# Patient Record
Sex: MALE | Race: WHITE
[De-identification: names, ages, dates, MRNs, and addresses within clinical notes are randomized per-mention and may not be internally consistent; named-entity substitution may affect disease eponyms.]

---

## 2018-11-19 ENCOUNTER — HOSPITAL ENCOUNTER (EMERGENCY)
Dept: HOSPITAL 53 - M ED | Age: 53
Discharge: HOME | End: 2018-11-19
Payer: COMMERCIAL
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
## 2018-11-19 DIAGNOSIS — S00.03XA: Primary | ICD-10-CM

## 2018-11-19 PROCEDURE — 70450 CT HEAD/BRAIN W/O DYE: CPT

## 2019-11-02 ENCOUNTER — HOSPITAL ENCOUNTER (OUTPATIENT)
Dept: HOSPITAL 53 - M SLEEP | Age: 54
End: 2019-11-02
Attending: NURSE PRACTITIONER
Payer: COMMERCIAL

## 2019-11-02 DIAGNOSIS — G47.33: Primary | ICD-10-CM

## 2019-11-05 NOTE — SLEEPCENT
DATE OF PROCEDURE:  11/02/2019

 

ORDERED BY:  INGRID Dunne

 

Nocturnal polysomnography was performed for the titration of pressure therapy in

this patient with a history of obstructive sleep apnea syndrome.

 

For testing a ResMed F20 AirTouch full face mask of medium size was used, 4 cm of

water pressure were applied to the circuit and the lights were extinguished.

 

7 hours and 58 minutes of data were reviewed.  There were 450 minutes of sleep

identified.  Sleep latency was short at 2 minutes.  Rapid eye movement (REM)

latency was short at 57 minutes.  Sleep architecture was good with five REM

cycles.  Overall sleep efficiency 95.8%.  The patient's electrocardiogram showed

a sinus rhythm with an average heart rate of 64 beats per minute.  EEG showed

normal waveforms for awake and sleep.  Persistence of respiratory events prompted

an increase in continuous positive airway pressure (CPAP) despite optimal mask

fit and minimal air leak.  The patient was changed to a bilevel device.  Pressure

was advanced to a maximal pressure of 22 over 18.  Some mild hypopneic events

were seen, but no significant oxygen desaturations were documented at this level.

Some activity was noted throughout the study in the limb EMG leads but limb

movement arousal index was only 4.3.

 

IMPRESSION:

Obstructive sleep apnea syndrome (G47.33).

 

RECOMMENDATIONS:

Nightly use of pressure therapy using a bilevel device, inspiratory 22 over

expiratory 18.

## 2019-12-09 ENCOUNTER — HOSPITAL ENCOUNTER (OUTPATIENT)
Dept: HOSPITAL 53 - M LAB REF | Age: 54
End: 2019-12-09
Attending: PODIATRIST
Payer: COMMERCIAL

## 2019-12-09 DIAGNOSIS — L03.031: Primary | ICD-10-CM

## 2019-12-30 ENCOUNTER — HOSPITAL ENCOUNTER (OUTPATIENT)
Dept: HOSPITAL 53 - M RAD | Age: 54
End: 2019-12-30
Attending: PODIATRIST
Payer: COMMERCIAL

## 2019-12-30 DIAGNOSIS — M79.604: Primary | ICD-10-CM

## 2019-12-30 NOTE — REP
Clinical: Right lower extremity pain and swelling .

 

Technique:  Gray scale and color Doppler evaluation using linear high frequency

transducer.

 

Findings:

Ultrasound examination of the right lower extremity deep venous structures from

the common femoral vein to the popliteal vein demonstrates normal compressibility

flow and wave patterns in response to respiration and augmentation.  There is no

evidence for deep venous thrombosis.

 

Impression:

No evidence for deep venous thrombosis.

 

 

Electronically Signed by

Ambrosio Allen MD 12/30/2019 10:02 A

## 2020-02-29 ENCOUNTER — HOSPITAL ENCOUNTER (OUTPATIENT)
Dept: HOSPITAL 53 - M RAD | Age: 55
End: 2020-02-29
Attending: PODIATRIST
Payer: COMMERCIAL

## 2020-02-29 DIAGNOSIS — M66.371: Primary | ICD-10-CM

## 2020-03-01 NOTE — REP
MRI RIGHT ANKLE:

 

TECHNIQUE:  Sagittal proton density, STIR, axial proton density fat sat, T1,

coronal proton density, STIR.

 

The Achilles tendon is intact. Anterior tibial, peroneal, and flexor hallucis

longus tendons are intact. There is mild fluid surrounding the flexor digitorum

longus tendon suggesting mild tenosynovitis. There is moderate fluid surrounding

the posterior tibial tendon suggesting tenosynovitis. In addition, the distal

posterior tibial tendon demonstrates increased signal on T2-weighted images at

its insertion suggesting a partial tear. Anterior and posterior talofibular,

calcaneofibular, tibiofibular, and deltoid ligaments are intact. Plantar fascia

is unremarkable, plantar tendon is intact. Ill-defined soft tissue edema is seen

posteromedially. Mild fluid is seen along the posterior tibiotalar joint.

Nonspecific marrow edema is seen in the medial aspect of the distal tibia as well

as throughout the talus. There is also marrow edema in the medial and anterior

calcaneus. There is scattered subcortical marrow edema in the navicular and

cuneiform bones. No chondral defect is seen along the talar dome.

 

IMPRESSION:

 

There appears to be a partial tear of the distal posterior tibial tendon at its

insertion onto the navicular. There appears to be associated tenosynovitis. There

is probable tenosynovitis of flexor digitorum longus tendon.

 

Nonspecific marrow edema is seen in the medial aspect of the distal tibia,

throughout the talus, and the medial and anterior calcaneus, and also in a

subcortical location in the navicular and cuneiform bones. Diffuse soft tissue

edema is noted posteromedially.

 

 

Electronically Signed by

Savage Vicente MD 03/01/2020 06:19 P

## 2020-06-02 ENCOUNTER — HOSPITAL ENCOUNTER (OUTPATIENT)
Dept: HOSPITAL 53 - M LABSMTC | Age: 55
End: 2020-06-02
Attending: ANESTHESIOLOGY
Payer: COMMERCIAL

## 2020-06-02 DIAGNOSIS — Z11.59: ICD-10-CM

## 2020-06-02 DIAGNOSIS — Z01.818: Primary | ICD-10-CM

## 2020-06-05 ENCOUNTER — HOSPITAL ENCOUNTER (OUTPATIENT)
Dept: HOSPITAL 53 - M SDC | Age: 55
Discharge: HOME | End: 2020-06-05
Attending: PODIATRIST
Payer: COMMERCIAL

## 2020-06-05 VITALS — WEIGHT: 270 LBS | HEIGHT: 71 IN | BODY MASS INDEX: 37.8 KG/M2

## 2020-06-05 VITALS — DIASTOLIC BLOOD PRESSURE: 73 MMHG | SYSTOLIC BLOOD PRESSURE: 122 MMHG

## 2020-06-05 DIAGNOSIS — Z91.018: ICD-10-CM

## 2020-06-05 DIAGNOSIS — Z79.899: ICD-10-CM

## 2020-06-05 DIAGNOSIS — I10: ICD-10-CM

## 2020-06-05 DIAGNOSIS — Z79.84: ICD-10-CM

## 2020-06-05 DIAGNOSIS — E11.9: ICD-10-CM

## 2020-06-05 DIAGNOSIS — Z91.010: ICD-10-CM

## 2020-06-05 DIAGNOSIS — M20.41: ICD-10-CM

## 2020-06-05 DIAGNOSIS — J45.909: ICD-10-CM

## 2020-06-05 DIAGNOSIS — G47.30: ICD-10-CM

## 2020-06-05 DIAGNOSIS — M10.9: ICD-10-CM

## 2020-06-05 DIAGNOSIS — M20.11: Primary | ICD-10-CM

## 2020-06-05 PROCEDURE — 76000 FLUOROSCOPY <1 HR PHYS/QHP: CPT

## 2020-06-05 PROCEDURE — 28285 REPAIR OF HAMMERTOE: CPT

## 2020-06-05 PROCEDURE — 28270 RELEASE OF FOOT CONTRACTURE: CPT

## 2020-06-05 PROCEDURE — 28296 COR HLX VLGS DSTL MTAR OSTEO: CPT

## 2020-06-05 PROCEDURE — 97116 GAIT TRAINING THERAPY: CPT

## 2020-06-05 PROCEDURE — 73630 X-RAY EXAM OF FOOT: CPT

## 2020-06-05 PROCEDURE — 88300 SURGICAL PATH GROSS: CPT

## 2020-06-05 NOTE — REP
REASON:  Status post bunionectomy.

 

Three images were obtained.

 

Fixation pins are seen affixing and fusing digits 2 through 4, inclusive. There

is a single screw affixing a derotational osteotomy  of the 1st

metatarsal. The examination is limited by overlying casting material.

 

 

Electronically Signed by

Isma Marie DO 06/05/2020 05:13 P

## 2020-06-10 NOTE — RO
DATE OF PROCEDURE:  06/05/2020

 

PREPROCEDURE DIAGNOSES:  Hallux valgus metatarsus primus varus deformity, right

foot. Hammertoe deformity digits 2, 3, 4, and 5 right foot.

 

POSTPROCEDURE DIAGNOSES:  Hallux valgus metatarsus primus varus deformity, right

foot. Hammertoe deformity digits 2, 3, 4, and 5 right foot.

 

 

PROCEDURES PERFORMED:

1.  Arnel bunionectomy with internal screw fixation.

2.  Proximal interphalangeal joint arthroplasty, 2nd toe right foot.

3.  Proximal interphalangeal joint arthroplasty, 3rd toe right foot.

4.  Proximal interphalangeal joint arthroplasty, 4th toe right foot.

5.  Proximal interphalangeal joint arthroplasty 5th toe right foot.

6.  Extensor capsulotomy 2nd metatarsophalangeal joint right foot.

7.  Extensor capsulotomy 3rd metatarsophalangeal joint right foot.

8.  Extensor capsulotomy 4th metatarsophalangeal joint  right foot.

 

SURGEON:  Dr. Landry Celestin.

 

ASSISTANT:  None.

 

ANESTHESIA:  Local with monitored anesthesia care (MAC).

 

HEMOSTASIS:  Ankle pneumatic tourniquet at 225 mmHg for 82 minutes.

 

HARDWARE UTILIZED:  Arthrex headless 3.0 x 30 mm compression screw and 0.045 wire

times three.

 

DESCRIPTION OF PROCEDURE:  On 06/05/2020, this 54-year-old male was taken from

his hospital room to the operating room and placed on the operating room table in

supine position. Following the induction of IV sedation and local and regional

anesthesia, the right lower extremity was prepped and draped in the usual aseptic

manner. Attention was directed to the patient's right foot where there was noted

to be a moderate hallux valgus deformity.  At this time, the following procedure

was performed.

 

ARNEL BUNIONECTOMY, INTERNAL SCREW FIXATION 3.0 MM X 30 MM X 1 RIGHT FOOT:

Attention was directed to the patient's right foot. There was noted to be a

hallux valgus deformity. At this time, a 7 cm incision was placed over the first

metatarsophalangeal joint. The incision was deepened to the subcutaneous tissues,

and all coursing venous tributaries were identified, underscored, clamped, cut

ligated, and electrocoagulated as necessary. A linear capsulotomy was performed

in the same plane as the original skin incision. The capsule and periosteal

structures were then dissected free in one continuous layer, dorsally, medially

and laterally, thus creating a capsular periosteal type envelope. This delivered

into view the hypertrophied medial eminence of the first metatarsal which was

osteotomized from distal to plantar through and through.

 

Attention was directed into the medial surface of the 1st intermetatarsal and the

following Arnel osteotomy was performed.  This was with a long plantar wing and

a short dorsal wing. Attention was directed to the distal metaphysis where this

V-shaped osteotomy was created from medial to lateral through and through and

this was transposed approximately 30% width of the shaft of the 1st metatarsal

and then fixated with a 3.0 x 30 mm headless compression screw.  The osteotomy

was noted to be stable in all three cardinal planes. The redundant cortical spike

was then osteotomized from the dorsal to plantar, through and through.  The wound

was flushed with copious amounts of dilute bacitracin, neomycin and polymyxin B

solution.

 

Attention was directed toward closure where the capsular structures were coapted

and maintained using #2-0 Monocryl in a simple interrupted type fashion.  The

subcutaneous tissues were coapted and maintained using #4-0 Monocryl in a simple

interrupted type fashion. Skin incisions were coapted and maintained using #4-0

Prolene in a simple interrupted fashion. Attention was then directed to the

patient's 2nd toe where the following procedure was performed.

 

PROXIMAL INTERPHALAGEAL JOINT ARTHROPLASTY WITH EXTERNAL WIRE FIXATION 0.045

TIMES ONE, 2ND TOE RIGHT FOOT:

Attention was directed to the patient's 2nd toe where there was noted to be a

hammertoe deformity.  At this time, a 3 cm incision was placed over the proximal

interphalangeal joint.  The incision was deepened to the subcutaneous tissues and

all coursing tributaries were identified, underscored, clamp, cut, ligated and

electrocoagulated as necessary.  A linear tenotomy and capsulotomy was then

performed at the proximal interphalangeal joint.  The medial and lateral

collateral ligaments are sharply dissected free from the head of the proximal

phalanx.  Utilizing a power saw and osteotomy was performed in the anatomical

length of the proximal from dorsal to planar, medial to lateral through and

through.  Attention was directed to the patient's middle phalanx where the

cartilage was removed from dorsal to planar through and through.  This was

extirpated from the wound.  The wound was flushed with copious amount of dilute

bacitracin, neomycin and polymyxin B solution.  There was still noted to be a

contracture at the metatarsophalangeal joint.  Therefore, the following procedure

was performed.

 

EXTENSOR CAPSULOTOMY 2ND METATARSOPHALANGEAL JOINT RIGHT FOOT:

Attention was directed to the patient's metatarsophalangeal joint where a stab

incision was placed over the dorsal aspect of the 2nd metatarsophalangeal joint.

The extensor tendon then was visualized and utilizing the previous excision, the

extensor expansion and sherwood was dissected and the tendon to the 2nd toe was then

pulled through the stab incision.  This allowed access to the metatarsophalangeal

joint capsule without risk to damaging the extensor tendon.  The dorsal

capsulotomy was then performed.  The wound was then flushed with copious amounts

of dilute bacitracin, neomycin and polymyxin B solution. Utilizing a hemostat,

the tendon was then brought back into its original position.  Utilizing a 0.045

wire, a wire was driven percutaneously through the middle and distal phalanx and

then retrograded into the proximal phalanx utilizing C-arm imagery and proximal

to the joint metatarsophalangeal joint.  The wire was bent in a  protective gall

and placed in the distal end of the wire.

 

The procedure performed on the 4th toe is now performed on the 5th toe with the

following variations: No K-wire was utilized to stabilize the 5th toe and the

cartilage was not removed from the middle phalanx. Following the completion of

the surgical procedure 4 mg of dexamethasone sodium phosphorus was instilled in

the proximal surgical site. Attention was direct to bandaging with sterile

compressive bandage applied consisting of Adaptic, 4x4's, splints and Koban.

Ankle pneumatic tourniquet was rapidly deflated and instantaneous capillary

filling was noted to digits 1-5 to the patient's right foot. The patient after

having tolerated the procedure well was taken to the recovery room for further

monitoring by the anesthesia department. Postoperative instructions were given

upon discharge.

 

This was additionally reinforced with Steri-Strips.  Following the completion of

the surgical procedure, approximately 4 mg of dexamethasone sodium phosphorous

was instilled proximal to the surgical site.  Attention was directed toward

closure where the extensor tendon was coapted and maintained utilizing #4-0

SUPERAMID in a four-stranded core repair in a Self fashion.  The skin was

coapted and maintained utilizing #4-0 Prolene in a simple interrupted type

fashion. The skin over the 2nd metatarsophalangeal joint capsulotomy was repaired

with #4-0 Prolene in a simple interrupted type fashion.  Attention was then

directed to the 3rd toe where the following procedure was performed.

 

PROXIMAL INTERPHALAGEAL JOINT ARTHROPLASTY WITH EXTERNAL WIRE FIXATION 0.045

TIMES ONE, 3RD TOE RIGHT FOOT:

Attention was directed to the patient's 3rd toe right foot where the procedure

was performed on the 2nd toe was now performed on the 3rd toe without variation.

or deletion except for that of anatomical location.  Attention was then directed

to the 4th toe where the following procedure was performed.

 

PROXIMAL INTERPHALAGEAL JOINT ARTHROPLASTY WITH EXTERNAL WIRE FIXATION 0.045

TIMES ONE, 4TH TOE RIGHT FOOT:

Attention was directed to the patient's 4th toe right foot where the procedure

was performed on the 2nd toe was now performed on the 4th toe without variation

or deletion.  The only exception being that of anatomical location.  Attention

was then directed to the 5th toe where the following procedure was performed.

 

PROXIMAL INTERPHALAGEAL JOINT ARTHROPLASTY 5TH TOE RIGHT FOOT:

Attention was directed to the patient's 5th toe right foot where the procedure

was performed on the 2nd toe was now performed on the 5th toe with the following

variation.  No external wire was utilized.  Attention was then directed to the

3rd metatarsophalangeal joint, where the following procedure was performed.

 

EXTENSOR CAPSULOTOMY 3RD METATARSOPHALANGEAL JOINT, RIGHT FOOT:

Attention was directed to the patient's 3rd metatarsophalangeal joint where the

procedure performed on the second metatarsophalangeal joint was now performed on

the 3rd metatarsophalangeal joint without variation or deletion. The only

exception being anatomical location.  Attention was then directed to the 3rd

metatarsophalangeal joint, where the following procedure was performed.

 

EXTENSOR CAPSULOTOMY 4TH METATARSOPHALANGEAL JOINT, RIGHT FOOT:

Attention was directed to the patient's 4th metatarsophalangeal joint where the

procedure performed on the second metatarsophalangeal joint was now performed on

the 4th metatarsophalangeal joint without variation or deletion. The only

exception being anatomical location.

 

Attention was then directed towards bandaging where a sterile compressive bandage

applied consisting of Adaptic, 4x4's, 4 x 4 splints, Mimi and Kerlix. Ankle

pneumatic tourniquet was rapidly deflated and instantaneous capillary filling was

noted to digits 1-5 to the patient's right foot. A fiberglass cast was then

applied to the patient's right foot.  The patient after having tolerated the

procedure well was taken to the recovery room, vital signs stable, afebrile for

further monitoring by the anesthesia department.  All surgical specimens  during

the operative procedure were sent to pathology for gross or microscopic

examination.  Postoperative instructions were given upon discharge.

## 2021-04-02 ENCOUNTER — HOSPITAL ENCOUNTER (OUTPATIENT)
Dept: HOSPITAL 53 - M RAD | Age: 56
End: 2021-04-02
Attending: PHYSICIAN ASSISTANT
Payer: COMMERCIAL

## 2021-04-02 DIAGNOSIS — M50.322: ICD-10-CM

## 2021-04-02 DIAGNOSIS — M50.321: Primary | ICD-10-CM

## 2021-04-02 NOTE — REPVR
PROCEDURE INFORMATION: 

Exam: MR Cervical Spine Without Contrast 

Exam date and time: 4/2/2021 10:20 AM 

Age: 55 years old 

Clinical indication: Neck pain; Additional info: Cervical disc degeneration 



TECHNIQUE: 

Imaging protocol: Multiplanar magnetic resonance images of the cervical spine 

without contrast. 



COMPARISON: 

CT Spine,cervical w/o contrast 11/19/2018 1:51 PM 



FINDINGS: 

Vertebrae: There is straightening of the cervical spine which could be 

secondary to positioning or muscle spasm. The cervical vertebral bodies are 

normal height and alignment.No acute fracture or dislocation is seen.The 

atlantoaxial articulation is normal. 

Spinal cord: The cervical spinal cord is normal in thickness and signal 

intensity.There is no cord compression or intramedullary signal abnormality. 

Spinal epidural space: There is no evidence for epidural mass or hemorrhage. 

C2-C3: No significant disc disease. No significant spinal stenosis. 

C3-C4: There is no significant degenerative disc herniation.The spinal canal 

and neural foramina are patent and without significant stenosis. 

C4-C5: Markedly reduced in height and T2 signal indicating degeneration.  Mild 

degenerative endplate changes.  Small diffuse posterior herniation.There is 

bilateral uncovertebral hypertrophic changes.The facet joints demonstrate mild 

degenerative hypertrophy and sclerosis.There is mild spinal canal narrowing, 

with an AP canal dimension of 10 mm. There is severe bilateral foraminal 

stenosis. There is compression on the bilateral exiting nerve root.   

C5-C6: Markedly reduced in height and T2 signal indicating degeneration.  Mild 

degenerative endplate changes.  Small diffuse posterior herniation.There is 

bilateral uncovertebral hypertrophic changes.The facet joints demonstrate mild 

degenerative hypertrophy and sclerosis.There is mild spinal canal narrowing, 

with an AP canal dimension of 10 mm. There is severe bilateral foraminal 

stenosis. There is compression on the bilateral exiting nerve root.    

C6-C7: There is a mild diffuse posterior bulge causing mild effacement of the 

thecal sac.The facet joints demonstrate mild degenerative hypertrophy and 

sclerosis.There is no evidence of spinal canal narrowing.There is mild 

bilateral foraminal stenosis. 

C7-T1: There is no significant degenerative disc herniation.The spinal canal 

and neural foramina are patent and without significant stenosis. 

Soft tissues: The prevertebral soft tissues appear normal. 



Brain: The visualized brain parenchyma is unremarkable. 

Vertebral arteries: Expected flow voids in the vertebral arteries. 



IMPRESSION: 

1. MRI of the cervical spine reveals multilevel degenerative spondylitic 

changes and degenerative disc disease, most significant at C4-C5 and C5-C6 

levels as described above. 

2. The cervical spinal cord is normal in thickness and signal intensity.There 

is no cord compression or intramedullary signal abnormality. 



Electronically signed by: William Ward On 04/02/2021  11:02:05 AM

## 2021-10-11 ENCOUNTER — HOSPITAL ENCOUNTER (OUTPATIENT)
Dept: HOSPITAL 53 - M LAB REF | Age: 56
End: 2021-10-11
Attending: PODIATRIST
Payer: COMMERCIAL

## 2021-10-11 DIAGNOSIS — L97.512: ICD-10-CM

## 2021-10-11 DIAGNOSIS — M79.671: Primary | ICD-10-CM

## 2022-11-04 ENCOUNTER — HOSPITAL ENCOUNTER (OUTPATIENT)
Dept: HOSPITAL 53 - M WUC | Age: 57
End: 2022-11-04
Attending: PHYSICIAN ASSISTANT
Payer: COMMERCIAL

## 2022-11-04 DIAGNOSIS — F52.21: ICD-10-CM

## 2022-11-04 DIAGNOSIS — I10: ICD-10-CM

## 2022-11-04 DIAGNOSIS — E78.5: Primary | ICD-10-CM

## 2022-11-04 DIAGNOSIS — E11.9: ICD-10-CM

## 2022-11-04 LAB
ALBUMIN SERPL BCG-MCNC: 4.4 GM/DL (ref 3.2–5.2)
BASOPHILS # BLD AUTO: 0.1 10^3/UL (ref 0–0.2)
BASOPHILS NFR BLD AUTO: 0.6 % (ref 0–1)
BUN SERPL-MCNC: 14 MG/DL (ref 7–18)
CALCIUM SERPL-MCNC: 9.7 MG/DL (ref 8.5–10.1)
CHLORIDE SERPL-SCNC: 103 MEQ/L (ref 98–107)
CHOLEST SERPL-MCNC: 118 MG/DL (ref ?–200)
CHOLEST/HDLC SERPL: 3.81 {RATIO} (ref ?–5)
CO2 SERPL-SCNC: 27 MEQ/L (ref 21–32)
CREAT SERPL-MCNC: 0.67 MG/DL (ref 0.7–1.3)
EOSINOPHIL # BLD AUTO: 0.1 10^3/UL (ref 0–0.5)
EOSINOPHIL NFR BLD AUTO: 1.8 % (ref 0–3)
EST. AVERAGE GLUCOSE BLD GHB EST-MCNC: 180 MG/DL (ref 60–110)
GFR SERPL CREATININE-BSD FRML MDRD: > 60 ML/MIN/{1.73_M2} (ref 56–?)
GLUCOSE SERPL-MCNC: 139 MG/DL (ref 70–100)
HCT VFR BLD AUTO: 49.7 % (ref 42–52)
HDLC SERPL-MCNC: 31 MG/DL (ref 40–?)
HGB BLD-MCNC: 15.8 G/DL (ref 13.5–17.5)
LDLC SERPL CALC-MCNC: 39 MG/DL (ref ?–100)
LYMPHOCYTES # BLD AUTO: 2.4 10^3/UL (ref 1.5–5)
LYMPHOCYTES NFR BLD AUTO: 30.7 % (ref 24–44)
MCH RBC QN AUTO: 29.7 PG (ref 27–33)
MCHC RBC AUTO-ENTMCNC: 31.8 G/DL (ref 32–36.5)
MCV RBC AUTO: 93.4 FL (ref 80–96)
MONOCYTES # BLD AUTO: 0.6 10^3/UL (ref 0–0.8)
MONOCYTES NFR BLD AUTO: 8 % (ref 2–8)
NEUTROPHILS # BLD AUTO: 4.6 10^3/UL (ref 1.5–8.5)
NEUTROPHILS NFR BLD AUTO: 58.3 % (ref 36–66)
NONHDLC SERPL-MCNC: 87 MG/DL
PHOSPHATE SERPL-MCNC: 3.5 MG/DL (ref 2.5–4.9)
PLATELET # BLD AUTO: 163 10^3/UL (ref 150–450)
POTASSIUM SERPL-SCNC: 4.5 MEQ/L (ref 3.5–5.1)
RBC # BLD AUTO: 5.32 10^6/UL (ref 4.3–6.1)
SODIUM SERPL-SCNC: 139 MEQ/L (ref 136–145)
TRIGL SERPL-MCNC: 239 MG/DL (ref ?–150)
WBC # BLD AUTO: 8 10^3/UL (ref 4–10)

## 2022-11-22 ENCOUNTER — HOSPITAL ENCOUNTER (OUTPATIENT)
Dept: HOSPITAL 53 - M LAB REF | Age: 57
End: 2022-11-22
Attending: PODIATRIST
Payer: COMMERCIAL

## 2022-11-22 DIAGNOSIS — M79.671: Primary | ICD-10-CM

## 2023-01-27 ENCOUNTER — HOSPITAL ENCOUNTER (OUTPATIENT)
Dept: HOSPITAL 53 - M IRPRO | Age: 58
End: 2023-01-27
Attending: PHYSICIAN ASSISTANT
Payer: COMMERCIAL

## 2023-01-27 DIAGNOSIS — M75.21: Primary | ICD-10-CM

## 2023-01-27 PROCEDURE — 20206 BIOPSY MUSCLE PERQ NEEDLE: CPT

## 2023-01-27 PROCEDURE — 76942 ECHO GUIDE FOR BIOPSY: CPT

## 2023-02-06 ENCOUNTER — HOSPITAL ENCOUNTER (OUTPATIENT)
Dept: HOSPITAL 53 - M WUC | Age: 58
End: 2023-02-06
Attending: PHYSICIAN ASSISTANT
Payer: COMMERCIAL

## 2023-02-06 DIAGNOSIS — E11.9: ICD-10-CM

## 2023-02-06 DIAGNOSIS — E75.5: ICD-10-CM

## 2023-02-06 DIAGNOSIS — I10: Primary | ICD-10-CM

## 2023-02-06 DIAGNOSIS — R53.83: ICD-10-CM

## 2023-02-06 LAB
ALBUMIN SERPL BCG-MCNC: 4.2 G/DL (ref 3.2–5.2)
ALP SERPL-CCNC: 92 U/L (ref 46–116)
ALT SERPL W P-5'-P-CCNC: 66 U/L (ref 7–40)
AST SERPL-CCNC: 32 U/L (ref ?–34)
BASOPHILS # BLD AUTO: 0.1 10^3/UL (ref 0–0.2)
BASOPHILS NFR BLD AUTO: 0.6 % (ref 0–1)
BILIRUB SERPL-MCNC: 0.6 MG/DL (ref 0.3–1.2)
BUN SERPL-MCNC: 24 MG/DL (ref 9–23)
CALCIUM SERPL-MCNC: 9.5 MG/DL (ref 8.5–10.1)
CHLORIDE SERPL-SCNC: 103 MMOL/L (ref 98–107)
CHOLEST SERPL-MCNC: 103 MG/DL (ref ?–200)
CHOLEST/HDLC SERPL: 3.9 {RATIO} (ref ?–5)
CO2 SERPL-SCNC: 29 MMOL/L (ref 20–31)
CREAT SERPL-MCNC: 0.61 MG/DL (ref 0.7–1.3)
EOSINOPHIL # BLD AUTO: 0.1 10^3/UL (ref 0–0.5)
EOSINOPHIL NFR BLD AUTO: 1.3 % (ref 0–3)
EST. AVERAGE GLUCOSE BLD GHB EST-MCNC: 143 MG/DL (ref 60–110)
GFR SERPL CREATININE-BSD FRML MDRD: > 60 ML/MIN/{1.73_M2} (ref 56–?)
GLUCOSE SERPL-MCNC: 117 MG/DL (ref 60–100)
HCT VFR BLD AUTO: 48.6 % (ref 42–52)
HDLC SERPL-MCNC: 26.4 MG/DL (ref 40–?)
HGB BLD-MCNC: 15.6 G/DL (ref 13.5–17.5)
LDLC SERPL CALC-MCNC: 38.8 MG/DL (ref ?–100)
LYMPHOCYTES # BLD AUTO: 2.6 10^3/UL (ref 1.5–5)
LYMPHOCYTES NFR BLD AUTO: 30.5 % (ref 24–44)
MCH RBC QN AUTO: 29.8 PG (ref 27–33)
MCHC RBC AUTO-ENTMCNC: 32.1 G/DL (ref 32–36.5)
MCV RBC AUTO: 92.7 FL (ref 80–96)
MONOCYTES # BLD AUTO: 0.7 10^3/UL (ref 0–0.8)
MONOCYTES NFR BLD AUTO: 7.8 % (ref 2–8)
NEUTROPHILS # BLD AUTO: 5.1 10^3/UL (ref 1.5–8.5)
NEUTROPHILS NFR BLD AUTO: 59.3 % (ref 36–66)
NONHDLC SERPL-MCNC: 77 MG/DL
PLATELET # BLD AUTO: 161 10^3/UL (ref 150–450)
POTASSIUM SERPL-SCNC: 4.6 MMOL/L (ref 3.5–5.1)
PROT SERPL-MCNC: 7.6 G/DL (ref 5.7–8.2)
RBC # BLD AUTO: 5.24 10^6/UL (ref 4.3–6.1)
SODIUM SERPL-SCNC: 139 MMOL/L (ref 136–145)
T4 FREE SERPL-MCNC: 1.03 NG/DL (ref 0.89–1.76)
TRIGL SERPL-MCNC: 189 MG/DL (ref ?–150)
TSH SERPL DL<=0.005 MIU/L-ACNC: 1.56 UIU/ML (ref 0.55–4.78)
WBC # BLD AUTO: 8.6 10^3/UL (ref 4–10)

## 2023-05-02 ENCOUNTER — HOSPITAL ENCOUNTER (OUTPATIENT)
Dept: HOSPITAL 53 - M LAB REF | Age: 58
End: 2023-05-02
Attending: PODIATRIST
Payer: COMMERCIAL

## 2023-05-02 DIAGNOSIS — L03.031: Primary | ICD-10-CM

## 2023-07-13 ENCOUNTER — HOSPITAL ENCOUNTER (OUTPATIENT)
Dept: HOSPITAL 53 - M LAB REF | Age: 58
End: 2023-07-13
Attending: PODIATRIST
Payer: COMMERCIAL

## 2023-07-13 DIAGNOSIS — L03.129: Primary | ICD-10-CM

## 2023-09-08 ENCOUNTER — HOSPITAL ENCOUNTER (OUTPATIENT)
Dept: HOSPITAL 53 - M WUC | Age: 58
End: 2023-09-08
Attending: NURSE PRACTITIONER
Payer: COMMERCIAL

## 2023-09-08 DIAGNOSIS — I10: Primary | ICD-10-CM

## 2023-09-08 DIAGNOSIS — E11.9: ICD-10-CM

## 2023-09-08 DIAGNOSIS — R53.83: ICD-10-CM

## 2023-09-08 DIAGNOSIS — E78.5: ICD-10-CM

## 2023-09-08 LAB
ALBUMIN SERPL BCG-MCNC: 4 G/DL (ref 3.2–5.2)
ALP SERPL-CCNC: 97 U/L (ref 46–116)
ALT SERPL W P-5'-P-CCNC: 79 U/L (ref 7–40)
AST SERPL-CCNC: 30 U/L (ref ?–34)
BASOPHILS # BLD AUTO: 0 10^3/UL (ref 0–0.2)
BASOPHILS NFR BLD AUTO: 0.5 % (ref 0–1)
BILIRUB SERPL-MCNC: 0.5 MG/DL (ref 0.3–1.2)
BUN SERPL-MCNC: 12 MG/DL (ref 9–23)
CALCIUM SERPL-MCNC: 9.2 MG/DL (ref 8.3–10.6)
CHLORIDE SERPL-SCNC: 103 MMOL/L (ref 98–107)
CHOLEST SERPL-MCNC: 77 MG/DL (ref ?–200)
CHOLEST/HDLC SERPL: 3.16 {RATIO} (ref ?–5)
CO2 SERPL-SCNC: 29 MMOL/L (ref 20–31)
CREAT SERPL-MCNC: 0.62 MG/DL (ref 0.7–1.3)
EOSINOPHIL # BLD AUTO: 0.2 10^3/UL (ref 0–0.5)
EOSINOPHIL NFR BLD AUTO: 2.4 % (ref 0–3)
EST. AVERAGE GLUCOSE BLD GHB EST-MCNC: 169 MG/DL (ref 60–110)
GFR SERPL CREATININE-BSD FRML MDRD: > 60 ML/MIN/{1.73_M2} (ref 49–?)
GLUCOSE SERPL-MCNC: 128 MG/DL (ref 74–106)
HCT VFR BLD AUTO: 44.8 % (ref 42–52)
HDLC SERPL-MCNC: 24.3 MG/DL (ref 40–?)
HGB BLD-MCNC: 14.7 G/DL (ref 13.5–17.5)
LDLC SERPL CALC-MCNC: 19.3 MG/DL (ref ?–100)
LYMPHOCYTES # BLD AUTO: 2.1 10^3/UL (ref 1.5–5)
LYMPHOCYTES NFR BLD AUTO: 32.2 % (ref 24–44)
MAGNESIUM SERPL-MCNC: 2.1 MG/DL (ref 1.8–2.4)
MCH RBC QN AUTO: 30 PG (ref 27–33)
MCHC RBC AUTO-ENTMCNC: 32.8 G/DL (ref 32–36.5)
MCV RBC AUTO: 91.4 FL (ref 80–96)
MONOCYTES # BLD AUTO: 0.6 10^3/UL (ref 0–0.8)
MONOCYTES NFR BLD AUTO: 9.1 % (ref 2–8)
NEUTROPHILS # BLD AUTO: 3.7 10^3/UL (ref 1.5–8.5)
NEUTROPHILS NFR BLD AUTO: 55.5 % (ref 36–66)
NONHDLC SERPL-MCNC: 52.7 MG/DL
PLATELET # BLD AUTO: 149 10^3/UL (ref 150–450)
POTASSIUM SERPL-SCNC: 4.4 MMOL/L (ref 3.5–5.1)
PROT SERPL-MCNC: 7.4 G/DL (ref 5.7–8.2)
RBC # BLD AUTO: 4.9 10^6/UL (ref 4.3–6.1)
SODIUM SERPL-SCNC: 140 MMOL/L (ref 136–145)
T4 FREE SERPL-MCNC: 0.9 NG/DL (ref 0.89–1.76)
TRIGL SERPL-MCNC: 167 MG/DL (ref ?–150)
TSH SERPL DL<=0.005 MIU/L-ACNC: 2.12 UIU/ML (ref 0.55–4.78)
WBC # BLD AUTO: 6.6 10^3/UL (ref 4–10)

## 2023-12-01 ENCOUNTER — HOSPITAL ENCOUNTER (OUTPATIENT)
Dept: HOSPITAL 53 - M PLALAB | Age: 58
End: 2023-12-01
Attending: NURSE PRACTITIONER
Payer: COMMERCIAL

## 2023-12-01 DIAGNOSIS — J44.9: ICD-10-CM

## 2023-12-01 DIAGNOSIS — E78.5: ICD-10-CM

## 2023-12-01 DIAGNOSIS — K21.9: ICD-10-CM

## 2023-12-01 DIAGNOSIS — E11.9: Primary | ICD-10-CM

## 2023-12-01 DIAGNOSIS — I10: ICD-10-CM

## 2023-12-01 LAB
ALBUMIN SERPL BCG-MCNC: 4.1 G/DL (ref 3.2–5.2)
ALP SERPL-CCNC: 102 U/L (ref 46–116)
ALT SERPL W P-5'-P-CCNC: 83 U/L (ref 7–40)
AST SERPL-CCNC: 37 U/L (ref ?–34)
BILIRUB SERPL-MCNC: 0.7 MG/DL (ref 0.3–1.2)
BUN SERPL-MCNC: 13 MG/DL (ref 9–23)
CALCIUM SERPL-MCNC: 9.1 MG/DL (ref 8.3–10.6)
CHLORIDE SERPL-SCNC: 102 MMOL/L (ref 98–107)
CHOLEST SERPL-MCNC: 90 MG/DL (ref ?–200)
CHOLEST/HDLC SERPL: 3.65 {RATIO} (ref ?–5)
CO2 SERPL-SCNC: 29 MMOL/L (ref 20–31)
CREAT SERPL-MCNC: 0.58 MG/DL (ref 0.7–1.3)
EST. AVERAGE GLUCOSE BLD GHB EST-MCNC: 180 MG/DL (ref 60–110)
GFR SERPL CREATININE-BSD FRML MDRD: > 60 ML/MIN/{1.73_M2} (ref 49–?)
GLUCOSE SERPL-MCNC: 143 MG/DL (ref 74–106)
HCT VFR BLD AUTO: 48.3 % (ref 42–52)
HDLC SERPL-MCNC: 24.6 MG/DL (ref 40–?)
HGB BLD-MCNC: 15.9 G/DL (ref 13.5–17.5)
LDLC SERPL CALC-MCNC: 20.6 MG/DL (ref ?–100)
MCH RBC QN AUTO: 30.2 PG (ref 27–33)
MCHC RBC AUTO-ENTMCNC: 32.9 G/DL (ref 32–36.5)
MCV RBC AUTO: 91.7 FL (ref 80–96)
NONHDLC SERPL-MCNC: 65.4 MG/DL
PLATELET # BLD AUTO: 165 10^3/UL (ref 150–450)
POTASSIUM SERPL-SCNC: 4.2 MMOL/L (ref 3.5–5.1)
PROT SERPL-MCNC: 7.3 G/DL (ref 5.7–8.2)
RBC # BLD AUTO: 5.27 10^6/UL (ref 4.3–6.1)
SODIUM SERPL-SCNC: 140 MMOL/L (ref 136–145)
TRIGL SERPL-MCNC: 224 MG/DL (ref ?–150)
WBC # BLD AUTO: 8.9 10^3/UL (ref 4–10)

## 2024-01-15 ENCOUNTER — HOSPITAL ENCOUNTER (OUTPATIENT)
Dept: HOSPITAL 53 - M LAB REF | Age: 59
End: 2024-01-15
Attending: PODIATRIST
Payer: COMMERCIAL

## 2024-01-15 DIAGNOSIS — B95.7: ICD-10-CM

## 2024-01-15 DIAGNOSIS — L03.031: Primary | ICD-10-CM

## 2024-02-05 ENCOUNTER — HOSPITAL ENCOUNTER (OUTPATIENT)
Dept: HOSPITAL 53 - M LAB REF | Age: 59
End: 2024-02-05
Attending: PODIATRIST
Payer: COMMERCIAL

## 2024-02-05 DIAGNOSIS — L03.031: Primary | ICD-10-CM

## 2024-02-29 ENCOUNTER — HOSPITAL ENCOUNTER (OUTPATIENT)
Dept: HOSPITAL 53 - M LAB REF | Age: 59
End: 2024-02-29
Attending: PODIATRIST
Payer: COMMERCIAL

## 2024-02-29 DIAGNOSIS — L03.031: Primary | ICD-10-CM

## 2024-03-18 ENCOUNTER — HOSPITAL ENCOUNTER (OUTPATIENT)
Dept: HOSPITAL 53 - M WUC | Age: 59
End: 2024-03-18
Attending: NURSE PRACTITIONER
Payer: COMMERCIAL

## 2024-03-18 DIAGNOSIS — E78.5: ICD-10-CM

## 2024-03-18 DIAGNOSIS — K21.9: ICD-10-CM

## 2024-03-18 DIAGNOSIS — I10: ICD-10-CM

## 2024-03-18 DIAGNOSIS — E11.9: Primary | ICD-10-CM

## 2024-03-18 DIAGNOSIS — J44.9: ICD-10-CM

## 2024-03-18 LAB
ALBUMIN SERPL BCG-MCNC: 4.3 G/DL (ref 3.2–5.2)
ALP SERPL-CCNC: 101 U/L (ref 46–116)
ALT SERPL W P-5'-P-CCNC: 72 U/L (ref 7–40)
AST SERPL-CCNC: 28 U/L (ref ?–34)
BILIRUB SERPL-MCNC: 0.6 MG/DL (ref 0.3–1.2)
BUN SERPL-MCNC: 17 MG/DL (ref 9–23)
CALCIUM SERPL-MCNC: 9.4 MG/DL (ref 8.3–10.6)
CHLORIDE SERPL-SCNC: 105 MMOL/L (ref 98–107)
CHOLEST SERPL-MCNC: 80 MG/DL (ref ?–200)
CHOLEST/HDLC SERPL: 3.68 {RATIO} (ref ?–5)
CO2 SERPL-SCNC: 28 MMOL/L (ref 20–31)
CREAT SERPL-MCNC: 0.65 MG/DL (ref 0.7–1.3)
EST. AVERAGE GLUCOSE BLD GHB EST-MCNC: 194 MG/DL (ref 60–110)
GFR SERPL CREATININE-BSD FRML MDRD: > 60 ML/MIN/{1.73_M2} (ref 49–?)
GLUCOSE SERPL-MCNC: 141 MG/DL (ref 74–106)
HCT VFR BLD AUTO: 46.9 % (ref 42–52)
HDLC SERPL-MCNC: 21.7 MG/DL (ref 40–?)
HGB BLD-MCNC: 15.6 G/DL (ref 13.5–17.5)
LDLC SERPL CALC-MCNC: 18.3 MG/DL (ref ?–100)
MCH RBC QN AUTO: 30.2 PG (ref 27–33)
MCHC RBC AUTO-ENTMCNC: 33.3 G/DL (ref 32–36.5)
MCV RBC AUTO: 90.9 FL (ref 80–96)
NONHDLC SERPL-MCNC: 58.3 MG/DL
PLATELET # BLD AUTO: 167 10^3/UL (ref 150–450)
POTASSIUM SERPL-SCNC: 4.4 MMOL/L (ref 3.5–5.1)
PROT SERPL-MCNC: 7.4 G/DL (ref 5.7–8.2)
RBC # BLD AUTO: 5.16 10^6/UL (ref 4.3–6.1)
SODIUM SERPL-SCNC: 140 MMOL/L (ref 136–145)
TRIGL SERPL-MCNC: 200 MG/DL (ref ?–150)
WBC # BLD AUTO: 11.9 10^3/UL (ref 4–10)

## 2024-04-29 ENCOUNTER — HOSPITAL ENCOUNTER (OUTPATIENT)
Dept: HOSPITAL 53 - M LAB REF | Age: 59
End: 2024-04-29
Attending: PODIATRIST
Payer: COMMERCIAL

## 2024-04-29 ENCOUNTER — HOSPITAL ENCOUNTER (OUTPATIENT)
Dept: HOSPITAL 53 - M LAB REF | Age: 59
End: 2024-04-29
Attending: NURSE PRACTITIONER
Payer: COMMERCIAL

## 2024-04-29 DIAGNOSIS — E11.65: Primary | ICD-10-CM

## 2024-04-29 DIAGNOSIS — E11.621: Primary | ICD-10-CM

## 2024-04-29 LAB
CREAT UR-MCNC: 21.7 MG/DL
MICROALBUMIN UR-MCNC: 14 MG/L
MICROALBUMIN/CREAT UR: 64.5 MCG/MG (ref 0–30)

## 2024-07-26 ENCOUNTER — HOSPITAL ENCOUNTER (OUTPATIENT)
Dept: HOSPITAL 53 - M SFHCPLAZ | Age: 59
End: 2024-07-26
Attending: FAMILY MEDICINE
Payer: COMMERCIAL

## 2024-07-26 ENCOUNTER — HOSPITAL ENCOUNTER (OUTPATIENT)
Dept: HOSPITAL 53 - M PLALAB | Age: 59
End: 2024-07-26
Attending: FAMILY MEDICINE
Payer: COMMERCIAL

## 2024-07-26 DIAGNOSIS — E11.42: Primary | ICD-10-CM

## 2024-07-26 DIAGNOSIS — I10: ICD-10-CM

## 2024-07-26 LAB
ALBUMIN SERPL BCG-MCNC: 4.3 G/DL (ref 3.2–5.2)
ALP SERPL-CCNC: 97 U/L (ref 46–116)
ALT SERPL W P-5'-P-CCNC: 49 U/L (ref 7–40)
AST SERPL-CCNC: 17 U/L (ref ?–34)
BILIRUB SERPL-MCNC: 0.6 MG/DL (ref 0.3–1.2)
BUN SERPL-MCNC: 16 MG/DL (ref 9–23)
CALCIUM SERPL-MCNC: 9.3 MG/DL (ref 8.3–10.6)
CHLORIDE SERPL-SCNC: 103 MMOL/L (ref 98–107)
CHOLEST SERPL-MCNC: 100 MG/DL (ref ?–200)
CHOLEST/HDLC SERPL: 4.58 {RATIO} (ref ?–5)
CO2 SERPL-SCNC: 25 MMOL/L (ref 20–31)
CREAT SERPL-MCNC: 0.52 MG/DL (ref 0.7–1.3)
EST. AVERAGE GLUCOSE BLD GHB EST-MCNC: 166 MG/DL (ref 60–110)
GFR SERPL CREATININE-BSD FRML MDRD: > 60 ML/MIN/{1.73_M2} (ref 49–?)
GLUCOSE SERPL-MCNC: 142 MG/DL (ref 74–106)
HDLC SERPL-MCNC: 21.8 MG/DL (ref 40–?)
LDLC SERPL CALC-MCNC: 25.4 MG/DL (ref ?–100)
NONHDLC SERPL-MCNC: 78.2 MG/DL
POTASSIUM SERPL-SCNC: 3.8 MMOL/L (ref 3.5–5.1)
PROT SERPL-MCNC: 7.2 G/DL (ref 5.7–8.2)
SODIUM SERPL-SCNC: 138 MMOL/L (ref 136–145)
TRIGL SERPL-MCNC: 264 MG/DL (ref ?–150)

## 2024-10-28 ENCOUNTER — HOSPITAL ENCOUNTER (OUTPATIENT)
Dept: HOSPITAL 53 - M LAB | Age: 59
End: 2024-10-28
Attending: PHYSICIAN ASSISTANT
Payer: COMMERCIAL

## 2024-10-28 DIAGNOSIS — Z01.818: Primary | ICD-10-CM

## 2024-10-28 LAB
APTT BLD: 24.6 SECONDS (ref 24.8–34.2)
INR PPP: 0.96
PLATELET # BLD AUTO: 182 10^3/UL (ref 150–450)
PROTHROMBIN TIME: 13.1 SECONDS (ref 12.5–14.5)

## 2025-02-03 ENCOUNTER — HOSPITAL ENCOUNTER (OUTPATIENT)
Dept: HOSPITAL 53 - M WUC | Age: 60
End: 2025-02-03
Payer: COMMERCIAL

## 2025-02-03 DIAGNOSIS — E11.65: Primary | ICD-10-CM

## 2025-02-03 LAB
BUN SERPL-MCNC: 17 MG/DL (ref 9–23)
CALCIUM SERPL-MCNC: 9.5 MG/DL (ref 8.3–10.6)
CHLORIDE SERPL-SCNC: 106 MMOL/L (ref 98–107)
CHOLEST SERPL-MCNC: 113 MG/DL (ref ?–200)
CHOLEST/HDLC SERPL: 3.77 {RATIO} (ref ?–5)
CO2 SERPL-SCNC: 30 MMOL/L (ref 20–31)
CREAT SERPL-MCNC: 0.54 MG/DL (ref 0.7–1.3)
CREAT UR-MCNC: 36.5 MG/DL
GFR SERPL CREATININE-BSD FRML MDRD: > 60 ML/MIN/{1.73_M2} (ref 49–?)
GLUCOSE SERPL-MCNC: 154 MG/DL (ref 74–106)
HDLC SERPL-MCNC: 29.9 MG/DL (ref 40–?)
LDLC SERPL CALC-MCNC: 55.1 MG/DL (ref ?–100)
MICROALBUMIN UR-MCNC: 19 MG/L
MICROALBUMIN/CREAT UR: 52 MCG/MG (ref 0–30)
NONHDLC SERPL-MCNC: 83.1 MG/DL
POTASSIUM SERPL-SCNC: 4.4 MMOL/L (ref 3.5–5.1)
SODIUM SERPL-SCNC: 143 MMOL/L (ref 136–145)
T4 FREE SERPL-MCNC: 1.32 NG/DL (ref 0.89–1.76)
TRIGL SERPL-MCNC: 140 MG/DL (ref ?–150)
TSH SERPL DL<=0.005 MIU/L-ACNC: 1.67 UIU/ML (ref 0.55–4.78)

## 2025-02-27 ENCOUNTER — HOSPITAL ENCOUNTER (OUTPATIENT)
Dept: HOSPITAL 53 - M OPP | Age: 60
Discharge: HOME | End: 2025-02-27
Attending: SURGERY
Payer: COMMERCIAL

## 2025-02-27 VITALS — WEIGHT: 244 LBS | BODY MASS INDEX: 33.05 KG/M2 | HEIGHT: 72 IN

## 2025-02-27 VITALS — SYSTOLIC BLOOD PRESSURE: 125 MMHG | DIASTOLIC BLOOD PRESSURE: 77 MMHG | OXYGEN SATURATION: 95 %

## 2025-02-27 VITALS — TEMPERATURE: 98.2 F

## 2025-02-27 DIAGNOSIS — Z91.048: ICD-10-CM

## 2025-02-27 DIAGNOSIS — G47.30: ICD-10-CM

## 2025-02-27 DIAGNOSIS — K31.89: ICD-10-CM

## 2025-02-27 DIAGNOSIS — Z91.018: ICD-10-CM

## 2025-02-27 DIAGNOSIS — K57.30: ICD-10-CM

## 2025-02-27 DIAGNOSIS — Z79.84: ICD-10-CM

## 2025-02-27 DIAGNOSIS — Z12.11: Primary | ICD-10-CM

## 2025-02-27 DIAGNOSIS — K30: ICD-10-CM

## 2025-02-27 DIAGNOSIS — K44.9: ICD-10-CM

## 2025-02-27 DIAGNOSIS — Z79.899: ICD-10-CM

## 2025-03-03 ENCOUNTER — HOSPITAL ENCOUNTER (OUTPATIENT)
Dept: HOSPITAL 53 - M WUC | Age: 60
End: 2025-03-03
Attending: FAMILY MEDICINE
Payer: COMMERCIAL

## 2025-03-03 DIAGNOSIS — E11.42: ICD-10-CM

## 2025-03-03 DIAGNOSIS — E55.9: Primary | ICD-10-CM

## 2025-03-03 DIAGNOSIS — I10: ICD-10-CM

## 2025-03-03 LAB
25(OH)D3 SERPL-MCNC: 50.3 NG/ML (ref 20–100)
ALBUMIN SERPL BCG-MCNC: 4.2 G/DL (ref 3.2–5.2)
ALP SERPL-CCNC: 89 U/L (ref 40–129)
ALT SERPL W P-5'-P-CCNC: 58 U/L (ref 7–40)
AST SERPL-CCNC: 25 U/L (ref ?–34)
BILIRUB SERPL-MCNC: 0.7 MG/DL (ref 0.3–1.2)
BUN SERPL-MCNC: 19 MG/DL (ref 9–23)
CALCIUM SERPL-MCNC: 9.5 MG/DL (ref 8.3–10.6)
CHLORIDE SERPL-SCNC: 101 MMOL/L (ref 98–107)
CHOLEST SERPL-MCNC: 109 MG/DL (ref ?–200)
CHOLEST/HDLC SERPL: 5 {RATIO} (ref ?–5)
CO2 SERPL-SCNC: 29 MMOL/L (ref 20–31)
CREAT SERPL-MCNC: 0.67 MG/DL (ref 0.7–1.3)
EST. AVERAGE GLUCOSE BLD GHB EST-MCNC: 206 MG/DL (ref 60–110)
GFR SERPL CREATININE-BSD FRML MDRD: > 60 ML/MIN/{1.73_M2} (ref 49–?)
GLUCOSE SERPL-MCNC: 176 MG/DL (ref 74–106)
HDLC SERPL-MCNC: 21.8 MG/DL (ref 40–?)
NONHDLC SERPL-MCNC: 87.2 MG/DL
POTASSIUM SERPL-SCNC: 5.4 MMOL/L (ref 3.5–5.1)
PROT SERPL-MCNC: 7.8 G/DL (ref 5.7–8.2)
SODIUM SERPL-SCNC: 139 MMOL/L (ref 136–145)
TRIGL SERPL-MCNC: 473 MG/DL (ref ?–150)

## 2025-07-17 ENCOUNTER — HOSPITAL ENCOUNTER (OUTPATIENT)
Dept: HOSPITAL 53 - M LAB REF | Age: 60
End: 2025-07-17
Attending: PODIATRIST
Payer: COMMERCIAL

## 2025-07-17 DIAGNOSIS — L03.032: Primary | ICD-10-CM
